# Patient Record
Sex: FEMALE
[De-identification: names, ages, dates, MRNs, and addresses within clinical notes are randomized per-mention and may not be internally consistent; named-entity substitution may affect disease eponyms.]

---

## 2019-07-02 NOTE — MMO
Bilateral MAMMO Bilat Screen [facility].

 

CLINICAL HISTORY:

Patient is 54 years old and is seen for screening. The patient has no family

history of breast cancer.  The patient has no personal history of cancer. The

patient has a history of Implants in 2001.

 

VIEWS:

The views performed were:  bilateral craniocaudal with tomosynthesis and

bilateral mediolateral oblique with tomosynthesis.

 

MAMMOGRAM FINDINGS:

The breasts are heterogeneously dense, which could obscure a lesion on

mammography.

 

Finding 1:  Normal implants are present.

 

Finding 2:  There is a focal asymmetry seen in the sub-areolar region of the

right breast.

 

IMPRESSION:

FINDING 1:  IMPLANT FINDINGS IN BOTH BREASTS ARE BENIGN.

 

FINDING 2:  FOCAL ASYMMETRY IN THE RIGHT BREAST REQUIRES ADDITIONAL EVALUATION.

AN ULTRASOUND EXAM IS RECOMMENDED.

 

THE RESULTS OF THIS EXAM WERE SENT TO THE PATIENT.

 

ACR BI-RADS Category 0 - Incomplete:  Need additional imaging evaluation. [facility] will notify the patient of the need for additional imaging services.

 

MAMMOGRAPHY NOTE:

 1. A negative mammogram report should not delay a biopsy if a dominant of

 clinically suspicious mass is present.

 2. Approximately 10% to 15% of breast cancers are not detected by

 mammography.

 3. Adenosis and dense breasts may obscure an underlying neoplasm.

## 2019-07-05 NOTE — MMO
Right Breast MAMMO Unilat Diag [facility].

 

CLINICAL HISTORY:

Patient is 54 years old and is seen for diagnostic exam. The patient has no

family history of breast cancer.  The patient has no personal history of cancer.

The patient has a history of Implants in 2001.

 

VIEWS:

The views performed were:  right craniocaudal spot compression with

tomosynthesis; right mediolateral oblique spot compression with tomosynthesis;

right mediolateral; and right mediolateral spot compression with tomosynthesis.

 

FILMS COMPARED:

The present examination has been compared to a prior imaging study performed at

[facility] on 07/02/2019.

 

MAMMOGRAM FINDINGS:

The breast is heterogeneously dense, which could obscure a lesion on mammography.

 

There are benign appearing calcifications seen in the right breast.

 

No evidence of mass or distortion on diagnostic exam with tomosynthesis.

Recommend 6 month follow up to confirm stability.

 

IMPRESSION:

CALCIFICATIONS IN THE RIGHT BREAST ARE PROBABLY BENIGN. FOLLOW-UP IN 6 MONTHS IS

RECOMMENDED.

 

THE RESULTS OF THIS EXAM WERE SENT TO THE PATIENT.

 

ACR BI-RADS Category 3 - Probably benign finding - short interval follow-up

suggested. [facility] will notify the patient of the need for additional

imaging services.

 

MAMMOGRAPHY NOTE:

 1. A negative mammogram report should not delay a biopsy if a dominant of

 clinically suspicious mass is present.

 2. Approximately 10% to 15% of breast cancers are not detected by

 mammography.

 3. Adenosis and dense breasts may obscure an underlying neoplasm.

## 2019-12-13 NOTE — MMO
Right Breast MAMMO Unilat Diag [facility].

 

CLINICAL HISTORY:

Patient is 54 years old and is seen for follow-up at short-interval from prior

study. The patient has no family history of breast cancer.  The patient has no

personal history of cancer. The patient has a history of Implants in 2001.

 

VIEWS:

The views performed were:  right craniocaudal spot compression magnification;

right mediolateral; right mediolateral spot compression magnification; right

mediolateral with tomosynthesis; and right Implant displaced.

 

FILMS COMPARED:

The present examination has been compared to prior imaging studies performed at

[facility] on 07/02/2019, 07/05/2019 and 12/13/2019.

 

This study has been interpreted with the assistance of computer-aided detection.

 

MAMMOGRAM FINDINGS:

There are scattered fibroglandular densities.

 

There is an oval mass measuring 17 millimeters with associated coarse

popcorn-like calcifications seen in the anterior region of the right breast at 9

o'clock.

 

Ultrasound shows solid mass.  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

 

IMPRESSION:

MASS IN THE RIGHT BREAST IS PROBABLY BENIGN. FOLLOW-UP IN 6 MONTHS IS

RECOMMENDED.

 

THE RESULTS OF THIS EXAM WERE SENT TO THE PATIENT.

 

ACR BI-RADS Category 3 - Probably benign finding - short interval follow-up

suggested. [facility] will notify the patient of the need for additional

imaging services.

 

MAMMOGRAPHY NOTE:

 1. A negative mammogram report should not delay a biopsy if a dominant of

 clinically suspicious mass is present.

 2. Approximately 10% to 15% of breast cancers are not detected by

 mammography.

 3. Adenosis and dense breasts may obscure an underlying neoplasm.

 

 

Reported by: IKE GEORGE MD

Electonically Signed: [id number]

## 2019-12-13 NOTE — ULT
ULTRASOUND RIGHT BREAST LIMITED:

12/13/19

 

HISTORY: 

54-year-old female with right breast mass.

 

TECHNIQUE:  

Focused ultrasound of the 9 o'clock of the retroareolar region. 

 

FINDINGS: 

1 cm from the nipple, at the 9 o'clock position, there is a wider than tall, mildly macrolobulated, h
ypoechoic solid mass measuring approximately 2.1 x 0.7 x 2.1 cm. It contains a few macrocalcification
s (best demonstrated on the mammogram), highly suggestive of a degenerating fibroadenoma. Ultrasound 
guided percutaneous biopsy may be risky because of the close proximity of the breast implant.

 

The patient first presented for a bilateral screening mammogram on 7/2/19, when this mass was first d
iscovered and described as an asymmetry. Additional mammographic views and ultrasound were recommende
d at that time. 

 

However, an ultrasound was not performed at that time. Instead, additional views were performed, on 7
/5/19. Benign calcification were noted within the asymmetry. Six month follow-up was recommended for 
the asymmetry. 

 

The current ultrasound is the first ultrasound of the right breast that the patient has had. 

 

IMPRESSION: 

1.      BIRADS 3: Probably Benign Finding Initial Short-Interval Follow-Up Suggested

 [Initial short-term follow up (usually 6-month) examination.]

2.      An approximately 2 cm solid mass in the far anterior, periareolar right breast at the 9 o'david
ck position is highly likely to represent a fibroadenoma. 

3.      Recommend serial follow-up right mammograms and serial follow-up right breast ultrasounds at 
6 months, one year, and two years. 

 

POS: ZACK

## 2020-06-11 NOTE — ULT
FOCUSED ULTRASOUND OF THE RIGHT BREAST:

6/11/20

 

COMPARISON: 

12/13/19.

 

HISTORY: 

Re-evaluate solid mass at the 9 o'clock position of the right breast. 

 

FINDINGS: 

Focused ultrasound at the 9 o'clock position of the right breast approximately 1 cm from the nipple a
gain demonstrates a macrolobulated solid hypoechoic lesion with internal coarse calcification measuri
ng 2.1 x 0.7 x 1.0 cm, unchanged when compared to the prior examination. Its sonographic and mammogra
phic  appearance are most consistent with a benign fibroadenoma. 

 

IMPRESSION: 

BIRADS 3: Probably Benign Finding Initial Short-Interval Follow-Up Suggested

 Initial short-term follow up (usually 6-month) examination.  Recommend bilateral mammography and foc
used breast ultrasound in 6 months, December 2020.

## 2020-12-09 NOTE — MMO
Right Breast MAMMO Unilat Diag [facility].

 

CLINICAL HISTORY:

Patient is 55 years old and is seen for follow-up at short-interval from prior

study. The patient has no family history of breast cancer.  The patient has no

personal history of cancer. The patient has a history of Implants in 2001.

 

VIEWS:

The views performed were:  right craniocaudal; right craniocaudal with

tomosynthesis; right mediolateral oblique; right mediolateral oblique with

tomosynthesis; right mediolateral; and right mediolateral with tomosynthesis.

 

FILMS COMPARED:

The present examination has been compared to prior imaging studies performed at

[facility] on 12/13/2019, 06/11/2020 and 12/09/2020.

 

This study has been interpreted with the assistance of computer-aided detection.

 

MAMMOGRAM FINDINGS:

There are scattered fibroglandular densities.

 

There are benign appearing calcifications in the right breast. Mass with coarse

calcs at 9:00 is stable and consistent with fibroadenoma.

 

IMPRESSION:

FINDING IN THE RIGHT BREAST IS PROBABLY BENIGN. FOLLOW-UP IN 6 MONTHS IS

RECOMMENDED.

 

THE RESULTS OF THIS EXAM WERE SENT TO THE PATIENT.

 

ACR BI-RADS Category 3 - Probably benign finding - short interval follow-up

suggested. [facility] will notify the patient of the need for additional

imaging services.

 

MAMMOGRAPHY NOTE:

 1. A negative mammogram report should not delay a biopsy if a dominant of

 clinically suspicious mass is present.

 2. Approximately 10% to 15% of breast cancers are not detected by

 mammography.

 3. Adenosis and dense breasts may obscure an underlying neoplasm.

 

 

Reported by: NILA JESSICA MD

Electonically Signed: [id number]

## 2020-12-09 NOTE — ULT
LIMITED RIGHT BREAST ULTRASOUND:

12/9/20

 

HISTORY: 

Follow-up exam. 

 

FINDINGS: 

Comparison is made with the ultrasound of 6/11/20 and correlation is made with the mammograms of [facility] and 6/11/20. 

 

Focused ultrasound of the 9 o'clock position of the right breast 1 cm from the nipple redemonstrates 
the macrolobulated solid hypoechoic mass with internal coarse calcifications which remains stable and
 measures 2.1 x 0.7 x 1 cm. This is consistent with a benign fibroadenoma.

 

IMPRESSION: 

BIRADS 3: Probably Benign Finding Initial Short-Interval Follow-Up Suggested

 Initial short-term follow up (usually 6-month) examination. 

 

Six month follow-up ultrasound is recommended with diagnostic right mammogram and bilateral screening
 mammograms in June 2021. 

 

POS: OFF

## 2022-07-16 ENCOUNTER — HOSPITAL ENCOUNTER (EMERGENCY)
Dept: HOSPITAL 92 - ERS | Age: 57
Discharge: HOME | End: 2022-07-16
Payer: COMMERCIAL

## 2022-07-16 DIAGNOSIS — R52: ICD-10-CM

## 2022-07-16 DIAGNOSIS — Z20.822: ICD-10-CM

## 2022-07-16 DIAGNOSIS — R50.9: Primary | ICD-10-CM

## 2022-07-16 LAB
ALBUMIN SERPL BCG-MCNC: 3.2 G/DL (ref 3.5–5)
ALP SERPL-CCNC: 245 U/L (ref 40–110)
ALT SERPL W P-5'-P-CCNC: 73 U/L (ref 8–55)
ANION GAP SERPL CALC-SCNC: 17 MMOL/L (ref 10–20)
AST SERPL-CCNC: 72 U/L (ref 5–34)
BASOPHILS # BLD AUTO: 0 THOU/UL (ref 0–0.2)
BASOPHILS NFR BLD AUTO: 0.8 % (ref 0–1)
BILIRUB SERPL-MCNC: 1 MG/DL (ref 0.2–1.2)
BUN SERPL-MCNC: 10 MG/DL (ref 9.8–20.1)
CALCIUM SERPL-MCNC: 7.9 MG/DL (ref 7.8–10.44)
CHLORIDE SERPL-SCNC: 103 MMOL/L (ref 98–107)
CO2 SERPL-SCNC: 15 MMOL/L (ref 22–29)
CREAT CL PREDICTED SERPL C-G-VRATE: 0 ML/MIN (ref 70–130)
EOSINOPHIL # BLD AUTO: 0 THOU/UL (ref 0–0.7)
EOSINOPHIL NFR BLD AUTO: 0.2 % (ref 0–10)
GLOBULIN SER CALC-MCNC: 2.8 G/DL (ref 2.4–3.5)
GLUCOSE SERPL-MCNC: 103 MG/DL (ref 70–105)
HGB BLD-MCNC: 12.4 G/DL (ref 12–16)
LYMPHOCYTES # BLD: 0.8 THOU/UL (ref 1.2–3.4)
LYMPHOCYTES NFR BLD AUTO: 20.3 % (ref 21–51)
MCH RBC QN AUTO: 31.6 PG (ref 27–31)
MCV RBC AUTO: 92.9 FL (ref 78–98)
MONOCYTES # BLD AUTO: 0.2 THOU/UL (ref 0.11–0.59)
MONOCYTES NFR BLD AUTO: 4 % (ref 0–10)
NEUTROPHILS # BLD AUTO: 3 THOU/UL (ref 1.4–6.5)
NEUTROPHILS NFR BLD AUTO: 74.7 % (ref 42–75)
PLATELET # BLD AUTO: 164 THOU/UL (ref 130–400)
POTASSIUM SERPL-SCNC: 3.5 MMOL/L (ref 3.5–5.1)
RBC # BLD AUTO: 3.93 MILL/UL (ref 4.2–5.4)
SODIUM SERPL-SCNC: 131 MMOL/L (ref 136–145)
SP GR UR STRIP: 1.01 (ref 1–1.04)
WBC # BLD AUTO: 4 THOU/UL (ref 4.8–10.8)

## 2022-07-16 PROCEDURE — 81003 URINALYSIS AUTO W/O SCOPE: CPT

## 2022-07-16 PROCEDURE — 71045 X-RAY EXAM CHEST 1 VIEW: CPT

## 2022-07-16 PROCEDURE — U0003 INFECTIOUS AGENT DETECTION BY NUCLEIC ACID (DNA OR RNA); SEVERE ACUTE RESPIRATORY SYNDROME CORONAVIRUS 2 (SARS-COV-2) (CORONAVIRUS DISEASE [COVID-19]), AMPLIFIED PROBE TECHNIQUE, MAKING USE OF HIGH THROUGHPUT TECHNOLOGIES AS DESCRIBED BY CMS-2020-01-R: HCPCS

## 2022-07-16 PROCEDURE — 80053 COMPREHEN METABOLIC PANEL: CPT

## 2022-07-16 PROCEDURE — 85025 COMPLETE CBC W/AUTO DIFF WBC: CPT

## 2022-07-16 PROCEDURE — 36415 COLL VENOUS BLD VENIPUNCTURE: CPT

## 2022-07-16 PROCEDURE — 87804 INFLUENZA ASSAY W/OPTIC: CPT

## 2022-07-16 PROCEDURE — U0005 INFEC AGEN DETEC AMPLI PROBE: HCPCS

## 2022-08-16 ENCOUNTER — HOSPITAL ENCOUNTER (OUTPATIENT)
Dept: HOSPITAL 92 - BICMAMMO | Age: 57
Discharge: HOME | End: 2022-08-16
Attending: NURSE PRACTITIONER
Payer: COMMERCIAL

## 2022-08-16 DIAGNOSIS — R22.31: Primary | ICD-10-CM

## 2022-08-16 PROCEDURE — 77066 DX MAMMO INCL CAD BI: CPT

## 2022-08-16 PROCEDURE — G0279 TOMOSYNTHESIS, MAMMO: HCPCS

## 2025-02-21 NOTE — MMO
Bilateral MAMMO Bilat Diag [facility].

 

CLINICAL HISTORY:

Patient is 55 years old and is seen for follow-up at short-interval from prior

study. The patient has no family history of breast cancer.  The patient has no

personal history of cancer. The patient has a history of Implants in 2001.

 

VIEWS:

The views performed were:  bilateral craniocaudal with tomosynthesis; bilateral

mediolateral oblique with tomosynthesis; and bilateral mediolateral with

tomosynthesis.

 

FILMS COMPARED:

The present examination has been compared to prior imaging studies performed at

[facility] on 07/05/2019, 12/13/2019 and 06/11/2020.

 

This study has been interpreted with the assistance of computer-aided detection.

 

MAMMOGRAM FINDINGS:

There are scattered fibroglandular densities.

 

There is a stable lobular mass with associated coarse popcorn-like

calcifications seen in the anterior region of the right breast at 9 o'clock.

 

In the left breast, there are no suspicious masses, calcifications or areas of

architectural distortion.

 

IMPRESSION:

STABLE MASS IN THE RIGHT BREAST IS PROBABLY BENIGN. FOLLOW-UP IN 6 MONTHS IS

RECOMMENDED.

 

THE RESULTS OF THIS EXAM WERE SENT TO THE PATIENT.

 

ACR BI-RADS Category 3 - Probably benign finding - short interval follow-up

suggested. [facility] will notify the patient of the need for additional

imaging services.

 

MAMMOGRAPHY NOTE:

 1. A negative mammogram report should not delay a biopsy if a dominant of

 clinically suspicious mass is present.

 2. Approximately 10% to 15% of breast cancers are not detected by

 mammography.

 3. Adenosis and dense breasts may obscure an underlying neoplasm.

 

 

Reported by: DARCI CREWS MD

Electonically Signed: [id number] Trevor called placed to patient for post procedure follow up. Patient stated she's feeling a little sore and tired. Informed patient that soreness is to be expected after the procedure. Educated patient that it takes 3-5 days for the steroid to be effective and to allow adequate time for medication to work. Encouraged patient to alternate ice and heat and to take medications as prescribed. Pt verbalized understanding to call with any questions or concerns.      Procedure:   right lumbar 4, lumbar 5 TRANSFORAMINAL EPIDURAL STEROID INJECTION MULTIPLE LEVEL with local     Date: 2/20/25  Follow up Visit Scheduled: 3/5/25 @11